# Patient Record
Sex: FEMALE | Race: WHITE | Employment: UNEMPLOYED | URBAN - METROPOLITAN AREA
[De-identification: names, ages, dates, MRNs, and addresses within clinical notes are randomized per-mention and may not be internally consistent; named-entity substitution may affect disease eponyms.]

---

## 2017-03-15 DIAGNOSIS — F17.200 TOBACCO DEPENDENCE: ICD-10-CM

## 2017-03-15 RX ORDER — VARENICLINE TARTRATE 25 MG
KIT ORAL
Qty: 1 EACH | Refills: 0 | Status: CANCELLED | OUTPATIENT
Start: 2017-03-15

## 2017-03-16 ENCOUNTER — OFFICE VISIT (OUTPATIENT)
Dept: FAMILY MEDICINE CLINIC | Age: 42
End: 2017-03-16

## 2017-03-16 VITALS
WEIGHT: 142 LBS | DIASTOLIC BLOOD PRESSURE: 76 MMHG | BODY MASS INDEX: 21.52 KG/M2 | SYSTOLIC BLOOD PRESSURE: 134 MMHG | OXYGEN SATURATION: 98 % | HEIGHT: 68 IN | RESPIRATION RATE: 12 BRPM | HEART RATE: 99 BPM

## 2017-03-16 DIAGNOSIS — F41.9 ANXIETY: ICD-10-CM

## 2017-03-16 DIAGNOSIS — G43.909 MIGRAINE WITHOUT STATUS MIGRAINOSUS, NOT INTRACTABLE, UNSPECIFIED MIGRAINE TYPE: ICD-10-CM

## 2017-03-16 DIAGNOSIS — F17.200 TOBACCO DEPENDENCE: ICD-10-CM

## 2017-03-16 DIAGNOSIS — F98.8 ADD (ATTENTION DEFICIT DISORDER): Primary | ICD-10-CM

## 2017-03-16 PROCEDURE — 99214 OFFICE O/P EST MOD 30 MIN: CPT | Performed by: NURSE PRACTITIONER

## 2017-03-16 RX ORDER — DEXTROAMPHETAMINE SACCHARATE, AMPHETAMINE ASPARTATE, DEXTROAMPHETAMINE SULFATE AND AMPHETAMINE SULFATE 5; 5; 5; 5 MG/1; MG/1; MG/1; MG/1
TABLET ORAL
Qty: 45 TABLET | Refills: 0 | Status: SHIPPED | OUTPATIENT
Start: 2017-03-16 | End: 2017-10-02 | Stop reason: SDUPTHER

## 2017-03-16 RX ORDER — ELETRIPTAN HYDROBROMIDE 40 MG/1
40 TABLET, FILM COATED ORAL
Qty: 9 TABLET | Refills: 3 | Status: SHIPPED | OUTPATIENT
Start: 2017-03-16 | End: 2017-10-02 | Stop reason: SDUPTHER

## 2017-03-16 RX ORDER — ALPRAZOLAM 0.5 MG/1
0.5 TABLET ORAL 2 TIMES DAILY
Qty: 60 TABLET | Refills: 2 | Status: SHIPPED | OUTPATIENT
Start: 2017-03-16 | End: 2017-11-22 | Stop reason: SDUPTHER

## 2017-03-16 RX ORDER — IBUPROFEN 600 MG/1
600 TABLET ORAL EVERY 6 HOURS PRN
Qty: 120 TABLET | Refills: 0 | Status: SHIPPED | OUTPATIENT
Start: 2017-03-16 | End: 2018-02-01 | Stop reason: SDUPTHER

## 2017-03-16 RX ORDER — VARENICLINE TARTRATE 1 MG/1
1 TABLET, FILM COATED ORAL 2 TIMES DAILY
Qty: 60 TABLET | Refills: 3 | Status: SHIPPED | OUTPATIENT
Start: 2017-03-16 | End: 2017-10-02 | Stop reason: SDUPTHER

## 2017-03-16 RX ORDER — RIZATRIPTAN BENZOATE 10 MG/1
10 TABLET ORAL
Qty: 12 TABLET | Refills: 3 | Status: CANCELLED | OUTPATIENT
Start: 2017-03-16 | End: 2017-03-16

## 2017-03-16 RX ORDER — VARENICLINE TARTRATE 25 MG
KIT ORAL
Qty: 1 EACH | Refills: 0 | Status: SHIPPED | OUTPATIENT
Start: 2017-03-16 | End: 2017-10-02 | Stop reason: SDUPTHER

## 2017-03-16 ASSESSMENT — ENCOUNTER SYMPTOMS: SHORTNESS OF BREATH: 0

## 2017-07-11 ENCOUNTER — TELEPHONE (OUTPATIENT)
Dept: FAMILY MEDICINE CLINIC | Age: 42
End: 2017-07-11

## 2017-10-02 ENCOUNTER — TELEPHONE (OUTPATIENT)
Dept: FAMILY MEDICINE CLINIC | Age: 42
End: 2017-10-02

## 2017-10-02 ENCOUNTER — OFFICE VISIT (OUTPATIENT)
Dept: FAMILY MEDICINE CLINIC | Age: 42
End: 2017-10-02

## 2017-10-02 VITALS
WEIGHT: 144 LBS | HEIGHT: 68 IN | HEART RATE: 75 BPM | OXYGEN SATURATION: 99 % | DIASTOLIC BLOOD PRESSURE: 88 MMHG | SYSTOLIC BLOOD PRESSURE: 124 MMHG | BODY MASS INDEX: 21.82 KG/M2

## 2017-10-02 DIAGNOSIS — F17.200 TOBACCO DEPENDENCE: ICD-10-CM

## 2017-10-02 DIAGNOSIS — F41.8 DEPRESSION WITH ANXIETY: ICD-10-CM

## 2017-10-02 DIAGNOSIS — T74.91XA DOMESTIC VIOLENCE OF ADULT, INITIAL ENCOUNTER: ICD-10-CM

## 2017-10-02 DIAGNOSIS — F98.8 ADD (ATTENTION DEFICIT DISORDER): Primary | ICD-10-CM

## 2017-10-02 DIAGNOSIS — G43.909 MIGRAINE WITHOUT STATUS MIGRAINOSUS, NOT INTRACTABLE, UNSPECIFIED MIGRAINE TYPE: ICD-10-CM

## 2017-10-02 PROCEDURE — 90471 IMMUNIZATION ADMIN: CPT | Performed by: NURSE PRACTITIONER

## 2017-10-02 PROCEDURE — 99214 OFFICE O/P EST MOD 30 MIN: CPT | Performed by: NURSE PRACTITIONER

## 2017-10-02 PROCEDURE — 90688 IIV4 VACCINE SPLT 0.5 ML IM: CPT | Performed by: NURSE PRACTITIONER

## 2017-10-02 RX ORDER — DEXTROAMPHETAMINE SACCHARATE, AMPHETAMINE ASPARTATE, DEXTROAMPHETAMINE SULFATE AND AMPHETAMINE SULFATE 5; 5; 5; 5 MG/1; MG/1; MG/1; MG/1
TABLET ORAL
Qty: 45 TABLET | Refills: 0 | Status: SHIPPED | OUTPATIENT
Start: 2017-10-02 | End: 2017-11-22 | Stop reason: SDUPTHER

## 2017-10-02 RX ORDER — VARENICLINE TARTRATE 25 MG
KIT ORAL
Qty: 1 EACH | Refills: 0 | Status: SHIPPED | OUTPATIENT
Start: 2017-10-02 | End: 2017-11-22 | Stop reason: SDUPTHER

## 2017-10-02 RX ORDER — DEXTROAMPHETAMINE SACCHARATE, AMPHETAMINE ASPARTATE, DEXTROAMPHETAMINE SULFATE AND AMPHETAMINE SULFATE 5; 5; 5; 5 MG/1; MG/1; MG/1; MG/1
TABLET ORAL
Qty: 45 TABLET | Refills: 0 | Status: SHIPPED | OUTPATIENT
Start: 2017-10-02

## 2017-10-02 RX ORDER — CITALOPRAM 10 MG/1
10 TABLET ORAL DAILY
Qty: 30 TABLET | Refills: 5 | Status: SHIPPED | OUTPATIENT
Start: 2017-10-02 | End: 2018-02-01 | Stop reason: SDUPTHER

## 2017-10-02 RX ORDER — VARENICLINE TARTRATE 1 MG/1
1 TABLET, FILM COATED ORAL 2 TIMES DAILY
Qty: 60 TABLET | Refills: 3 | Status: SHIPPED | OUTPATIENT
Start: 2017-10-02

## 2017-10-02 RX ORDER — ALPRAZOLAM 0.5 MG/1
0.5 TABLET ORAL 2 TIMES DAILY
Qty: 60 TABLET | Refills: 2 | Status: SHIPPED | OUTPATIENT
Start: 2017-10-02

## 2017-10-02 ASSESSMENT — ENCOUNTER SYMPTOMS: SHORTNESS OF BREATH: 0

## 2017-10-02 NOTE — TELEPHONE ENCOUNTER
Called pt to inform; patient's secondary insurance ([employer]) covered RX. States she already picked medication up. While on the phone; patient requesting refill for Relpax RX. RX pending.

## 2017-10-02 NOTE — MR AVS SNAPSHOT
and go to all appointments, and call your doctor if you are having problems. It's also a good idea to know your test results and keep a list of the medicines you take. How can you care for yourself at home? · Ask your family, friends, and coworkers for support. You have a better chance of quitting if you have help and support. · Join a support group for people who are trying to quit using smokeless tobacco.  · Set a quit date. Pick your date carefully so that it is not right in the middle of a big deadline or stressful time. After you quit, do not use smokeless tobacco even once. Get rid of all spit cups, cans, and pouches after your last use. Clean your house and your clothes so that they do not smell of tobacco.  · Learn how to be a non-user. Think about ways you can avoid those things that make you reach for tobacco.  ¨ Learn some ways to deal with cravings, like calling a friend or going for a walk. Cravings often pass. ¨ Avoid situations that put you at greatest risk for using smokeless tobacco. For some people, it is hard to spend time with friends without dipping or chewing. For others, they might skip a coffee break with coworkers who smoke or use smokeless tobacco.  ¨ Change your daily routine. Take a different route to work, or eat a meal in a different place. · Cut down on stress. Calm yourself or release tension by doing an activity you enjoy, such as reading a book, taking a hot bath, or gardening. · Talk to your doctor or pharmacist about nicotine replacement therapy. You still get nicotine, but you do not use tobacco. Nicotine replacement products help you slowly reduce the amount of nicotine you need. Many of these products are available over the counter. They include nicotine patches, gum, lozenges, and inhalers. · Ask your doctor about bupropion (Wellbutrin) or varenicline (Chantix), which are prescription medicines. They do not contain nicotine.  They help you by reducing withdrawal symptoms, such as stress and anxiety. · Get regular exercise. Having healthy habits will help your body move past its craving for nicotine. · Be prepared to keep trying. Most people are not successful the first few times they try to quit. Do not get mad at yourself if you use tobacco again. Make a list of things you learned, and think about when you want to try again, such as next week, next month, or next year. Where can you learn more? Go to https://[employer].Where Was it Filmed. org and sign in to your [employer] account. Enter Z849 in the [facility] box to learn more about \"Stopping Smokeless Tobacco Use: Care Instructions. \"     If you do not have an account, please click on the \"Sign Up Now\" link. Current as of: March 20, 2017  Content Version: 11.3  © 4692-5915 [employer]. Care instructions adapted under license by [facility] ([facility]). If you have questions about a medical condition or this instruction, always ask your healthcare professional. [address] any warranty or liability for your use of this information. Today's Medication Changes          These changes are accurate as of: 10/2/17  9:52 AM.  If you have any questions, ask your nurse or doctor. START taking these medications           citalopram 10 MG tablet   Commonly known as:  CELEXA   Instructions: Take 1 tablet by mouth daily   Quantity:  30 tablet   Refills:  5   Started by:  Jose Alberto Rao CNP         CHANGE how you take these medications           * ALPRAZolam 0.5 MG tablet   Commonly known asBrenlokesh [place]   Instructions: Take 1 tablet by mouth 2 times daily   Quantity:  60 tablet   Refills:  2   What changed:  Another medication with the same name was added. Make sure you understand how and when to take each.    Changed by:  Jose Alberto Rao CNP       * ALPRAZolam 0.5 MG tablet   Commonly known as:  Max Cherry Instructions: Take 1 tablet by mouth 2 times daily   Quantity:  60 tablet   Refills:  2   What changed: You were already taking a medication with the same name, and this prescription was added. Make sure you understand how and when to take each. Changed by:  Dong Mclaughlin CNP       * Notice: This list has 2 medication(s) that are the same as other medications prescribed for you. Read the directions carefully, and ask your doctor or other care provider to review them with you. Where to Get Your Medications      These medications were sent to Patient [address] [facility], [address] [phone]  [address], [place] [address]     Phone:  [phone]     ALPRAZolam 0.5 MG tablet    amphetamine-dextroamphetamine 20 MG tablet    amphetamine-dextroamphetamine 20 MG tablet    amphetamine-dextroamphetamine 20 MG tablet    citalopram 10 MG tablet    varenicline 0.5 MG X 11 & 1 MG X 42 tablet    varenicline 1 MG tablet               Your Current Medications Are              amphetamine-dextroamphetamine (ADDERALL) 20 MG tablet Take 1 tab every am, then take 1/2 tab by 2 pm daily. Jenness Joshua amphetamine-dextroamphetamine (ADDERALL, 20MG,) 20 MG tablet Take 1 tab every am, then take 1/2 tab by 2 pm daily. Jenness Joshua amphetamine-dextroamphetamine (ADDERALL, 20MG,) 20 MG tablet Take 1 tab every am, then take 1/2 tab by 2 pm daily. .    varenicline (CHANTIX STARTING MONTH JUNI) 0.5 MG X 11 & 1 MG X 42 tablet Take by mouth.     varenicline (CHANTIX CONTINUING MONTH PAK) 1 MG tablet Take 1 tablet by mouth 2 times daily    ALPRAZolam (XANAX) 0.5 MG tablet Take 1 tablet by mouth 2 times daily    citalopram (CELEXA) 10 MG tablet Take 1 tablet by mouth daily    ALPRAZolam (XANAX) 0.5 MG tablet Take 1 tablet by mouth 2 times daily    ibuprofen (ADVIL;MOTRIN) 600 MG tablet Take 1 tablet by mouth every 6 hours as needed for Pain    eletriptan (RELPAX) 40 MG tablet Take 1 tablet by mouth once as needed (prn migraines) may repeat in 2 hours if necessary      Allergies              Sulfa Antibiotics Anaphylaxis    Imitrex [Sumatriptan]     Neck tightness      We Ordered/Performed the following           INFLUENZA, QUADV, 3 YRS AND OLDER, IM, MDV, 0.5ML (FLUZONE QUADV)          Additional Information        Basic Information     Date Of Birth Sex Race Ethnicity Preferred Language    1975 Female White Non-/Non  English      Problem List as of 10/2/2017  Date Reviewed: 10/2/2017                Anxiety    Migraine    ADD (attention deficit disorder)    HTN (hypertension)      Preventive Care        Date Due    HIV screening is recommended for all people regardless of risk factors  aged 15-65 years at least once (lifetime) who have never been HIV tested. 6/2/1992    Tetanus Combination Vaccine (1 - Tdap) 6/2/1996    Pneumococcal Vaccine - Pneumovax for adults aged 19-64 years with: chronic heart disease, chronic lung disease, diabetes mellitus, alcoholism, chronic liver disease, or cigarette smoking. (1 of 1 - PPSV23) 6/2/1996    Pap Smear 6/2/1998    Cholesterol Screening 6/2/2017    Yearly Flu Vaccine (1) 9/1/2017            MyChart Signup           Our records indicate that you have declined MyChart signup.

## 2017-10-02 NOTE — LETTER
[address] [facility] [address] [address]  Phone: [phone]  Fax: [phone]    Kirstin Rodriguez        October 2, 2017     Patient: Ayan Barnard   YOB: 1975   Date of Visit: 10/2/2017       To Whom it May Concern:    Mario Kennedy was seen in my office on 10/2/2017. She may return to work on 10/3/17. If you have any questions or concerns, please don't hesitate to call.     Sincerely,         Brittany Ramsay, CNP
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
## 2017-10-03 ENCOUNTER — TELEPHONE (OUTPATIENT)
Dept: FAMILY MEDICINE CLINIC | Age: 42
End: 2017-10-03

## 2017-10-03 RX ORDER — ELETRIPTAN HYDROBROMIDE 40 MG/1
40 TABLET, FILM COATED ORAL
Qty: 9 TABLET | Refills: 3 | Status: SHIPPED | OUTPATIENT
Start: 2017-10-03 | End: 2017-10-03

## 2017-10-04 NOTE — TELEPHONE ENCOUNTER
PA came back DENIED via CoverMyMeds, meaning that a Denial Letter will be faxed to office shortly. PCP may want to review Denial info to determine next steps. Thank you! Denial Info:\"PA Case [id number] is denied. For further questions, call [phone]. \"

## 2017-10-13 ENCOUNTER — TELEPHONE (OUTPATIENT)
Dept: FAMILY MEDICINE CLINIC | Age: 42
End: 2017-10-13

## 2017-10-16 NOTE — TELEPHONE ENCOUNTER
[facility] has the patients  incorrect in there system I am unable to submit the PA until this is corrected. Please notify patient so insurance can be changed.  Thank you

## 2017-11-10 ENCOUNTER — TELEPHONE (OUTPATIENT)
Dept: FAMILY MEDICINE CLINIC | Age: 42
End: 2017-11-10

## 2017-11-10 NOTE — TELEPHONE ENCOUNTER
PA came back APPROVED via CoverMyMeds, meaning that an Approval Letter will be faxed to office shortly. Patient can fill Rx when needed. Thank you! Approval Info: PA Case [id number] is Approved. For further questions, call [phone].

## 2017-11-10 NOTE — TELEPHONE ENCOUNTER
A  scanned  request for a  Prior Authorization for medication amphetamine-dextroamphetamine (ADDERALL, 20MG,) 20 MG tablet is attached to this encounter. Please initiate  this request with the patients insurance company.  Thank  You

## 2017-11-22 ENCOUNTER — OFFICE VISIT (OUTPATIENT)
Dept: ENT CLINIC | Age: 42
End: 2017-11-22

## 2017-11-22 VITALS
SYSTOLIC BLOOD PRESSURE: 122 MMHG | DIASTOLIC BLOOD PRESSURE: 78 MMHG | BODY MASS INDEX: 19.7 KG/M2 | HEART RATE: 76 BPM | WEIGHT: 130 LBS | HEIGHT: 68 IN

## 2017-11-22 DIAGNOSIS — S09.91XA TRAUMA OF EAR CANAL, INITIAL ENCOUNTER: Primary | ICD-10-CM

## 2017-11-22 PROCEDURE — G8484 FLU IMMUNIZE NO ADMIN: HCPCS | Performed by: OTOLARYNGOLOGY

## 2017-11-22 PROCEDURE — 99203 OFFICE O/P NEW LOW 30 MIN: CPT | Performed by: OTOLARYNGOLOGY

## 2017-11-22 PROCEDURE — 4004F PT TOBACCO SCREEN RCVD TLK: CPT | Performed by: OTOLARYNGOLOGY

## 2017-11-22 PROCEDURE — G8420 CALC BMI NORM PARAMETERS: HCPCS | Performed by: OTOLARYNGOLOGY

## 2017-11-22 PROCEDURE — G8427 DOCREV CUR MEDS BY ELIG CLIN: HCPCS | Performed by: OTOLARYNGOLOGY

## 2017-11-22 ASSESSMENT — ENCOUNTER SYMPTOMS
FACIAL SWELLING: 0
RHINORRHEA: 0
ALLERGIC/IMMUNOLOGIC NEGATIVE: 1
VOICE CHANGE: 0
SINUS PAIN: 0
SORE THROAT: 0
RESPIRATORY NEGATIVE: 1
SINUS PRESSURE: 0
TROUBLE SWALLOWING: 0
EYES NEGATIVE: 1

## 2017-11-22 NOTE — PROGRESS NOTES
Psychiatric: She has a normal mood and affect. Her behavior is normal. Judgment and thought content normal.       Assessment:      Findings are suggestive of an abrasion of the external auditory canal on the left side. Plan:      After cleaning the ear canal on the left with peroxide, the canal was painted with gentian violet. She is instructed to avoid Q-tips and that symptoms should yaw in the next 2-4 days.

## 2018-02-01 ENCOUNTER — OFFICE VISIT (OUTPATIENT)
Dept: FAMILY MEDICINE CLINIC | Age: 43
End: 2018-02-01

## 2018-02-01 VITALS
HEART RATE: 82 BPM | RESPIRATION RATE: 16 BRPM | DIASTOLIC BLOOD PRESSURE: 80 MMHG | BODY MASS INDEX: 23.35 KG/M2 | OXYGEN SATURATION: 98 % | SYSTOLIC BLOOD PRESSURE: 120 MMHG | WEIGHT: 153.6 LBS

## 2018-02-01 DIAGNOSIS — F41.8 DEPRESSION WITH ANXIETY: ICD-10-CM

## 2018-02-01 DIAGNOSIS — F98.8 ATTENTION DEFICIT DISORDER, UNSPECIFIED HYPERACTIVITY PRESENCE: ICD-10-CM

## 2018-02-01 DIAGNOSIS — M79.605 PAIN IN BOTH LOWER EXTREMITIES: Primary | ICD-10-CM

## 2018-02-01 DIAGNOSIS — Z91.14 CONTROLLED SUBSTANCE AGREEMENT TERMINATED: ICD-10-CM

## 2018-02-01 DIAGNOSIS — M79.604 PAIN IN BOTH LOWER EXTREMITIES: Primary | ICD-10-CM

## 2018-02-01 PROCEDURE — 99214 OFFICE O/P EST MOD 30 MIN: CPT | Performed by: NURSE PRACTITIONER

## 2018-02-01 RX ORDER — DEXTROAMPHETAMINE SACCHARATE, AMPHETAMINE ASPARTATE, DEXTROAMPHETAMINE SULFATE AND AMPHETAMINE SULFATE 5; 5; 5; 5 MG/1; MG/1; MG/1; MG/1
TABLET ORAL
Qty: 45 TABLET | Refills: 0 | Status: CANCELLED | OUTPATIENT
Start: 2018-02-01 | End: 2018-03-03

## 2018-02-01 RX ORDER — CITALOPRAM 10 MG/1
10 TABLET ORAL DAILY
Qty: 30 TABLET | Refills: 5 | Status: SHIPPED | OUTPATIENT
Start: 2018-02-01

## 2018-02-01 RX ORDER — CLONAZEPAM 0.5 MG/1
0.5 TABLET ORAL 2 TIMES DAILY PRN
Qty: 60 TABLET | Refills: 3 | Status: CANCELLED | OUTPATIENT
Start: 2018-02-01 | End: 2018-03-03

## 2018-02-01 RX ORDER — IBUPROFEN 600 MG/1
600 TABLET ORAL EVERY 8 HOURS PRN
Qty: 120 TABLET | Refills: 0 | Status: SHIPPED | OUTPATIENT
Start: 2018-02-01 | End: 2019-12-30 | Stop reason: SDUPTHER

## 2018-02-01 RX ORDER — ALPRAZOLAM 0.5 MG/1
0.5 TABLET ORAL 2 TIMES DAILY
Qty: 60 TABLET | Refills: 2 | Status: CANCELLED | OUTPATIENT
Start: 2018-02-01

## 2018-02-01 ASSESSMENT — ENCOUNTER SYMPTOMS: SHORTNESS OF BREATH: 0

## 2018-02-01 ASSESSMENT — PATIENT HEALTH QUESTIONNAIRE - PHQ9
SUM OF ALL RESPONSES TO PHQ9 QUESTIONS 1 & 2: 1
2. FEELING DOWN, DEPRESSED OR HOPELESS: 1
SUM OF ALL RESPONSES TO PHQ QUESTIONS 1-9: 1
1. LITTLE INTEREST OR PLEASURE IN DOING THINGS: 0
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
## 2019-12-30 ENCOUNTER — APPOINTMENT (OUTPATIENT)
Dept: CT IMAGING | Age: 44
End: 2019-12-30
Payer: COMMERCIAL

## 2019-12-30 ENCOUNTER — HOSPITAL ENCOUNTER (EMERGENCY)
Age: 44
Discharge: HOME | End: 2019-12-30
Payer: COMMERCIAL

## 2019-12-30 VITALS
WEIGHT: 160 LBS | HEART RATE: 107 BPM | SYSTOLIC BLOOD PRESSURE: 159 MMHG | OXYGEN SATURATION: 96 % | HEIGHT: 68 IN | TEMPERATURE: 98 F | BODY MASS INDEX: 24.25 KG/M2 | RESPIRATION RATE: 18 BRPM | DIASTOLIC BLOOD PRESSURE: 84 MMHG

## 2019-12-30 PROCEDURE — 72128 CT CHEST SPINE W/O DYE: CPT

## 2019-12-30 PROCEDURE — 72131 CT LUMBAR SPINE W/O DYE: CPT

## 2019-12-30 PROCEDURE — 6370000000 HC RX 637 (ALT 250 FOR IP): Performed by: NURSE PRACTITIONER

## 2019-12-30 PROCEDURE — 72125 CT NECK SPINE W/O DYE: CPT

## 2019-12-30 PROCEDURE — 99284 EMERGENCY DEPT VISIT MOD MDM: CPT

## 2019-12-30 RX ORDER — HYDROCODONE BITARTRATE AND ACETAMINOPHEN 5; 325 MG/1; MG/1
1 TABLET ORAL ONCE
Status: COMPLETED | OUTPATIENT
Start: 2019-12-30 | End: 2019-12-30

## 2019-12-30 RX ORDER — IBUPROFEN 800 MG/1
800 TABLET ORAL EVERY 8 HOURS PRN
Qty: 20 TABLET | Refills: 0 | Status: SHIPPED | OUTPATIENT
Start: 2019-12-30

## 2019-12-30 RX ADMIN — HYDROCODONE BITARTRATE AND ACETAMINOPHEN 1 TABLET: 5; 325 TABLET ORAL at 19:22

## 2019-12-30 ASSESSMENT — PAIN DESCRIPTION - PAIN TYPE: TYPE: ACUTE PAIN

## 2019-12-30 ASSESSMENT — ENCOUNTER SYMPTOMS
SHORTNESS OF BREATH: 0
BACK PAIN: 1
VOMITING: 0
DIARRHEA: 0
BLOOD IN STOOL: 0
ABDOMINAL PAIN: 0
SORE THROAT: 0
CONSTIPATION: 0
RHINORRHEA: 0
NAUSEA: 0

## 2019-12-30 ASSESSMENT — PAIN DESCRIPTION - LOCATION: LOCATION: BACK;NECK
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
## 2019-12-30 NOTE — ED NOTES
Bed: [address]-03  Expected date:   Expected time:   Means of arrival:   Comments:  katrin Vanegas RN  12/30/19 5659